# Patient Record
Sex: MALE | Race: WHITE | NOT HISPANIC OR LATINO | Employment: OTHER | URBAN - METROPOLITAN AREA
[De-identification: names, ages, dates, MRNs, and addresses within clinical notes are randomized per-mention and may not be internally consistent; named-entity substitution may affect disease eponyms.]

---

## 2020-12-22 NOTE — PATIENT DISCUSSION
CHOROIDAL NEVUS, OD: PRESCRIBED UV PROTECTION TO MINIMIZE UV EXPOSURE. PHOTOS TO DOCUMENT TODAY. RETURN FOR FOLLOW-UP AS SCHEDULED.

## 2022-12-01 ENCOUNTER — ESTABLISHED PATIENT (OUTPATIENT)
Dept: URBAN - METROPOLITAN AREA CLINIC 19 | Facility: CLINIC | Age: 72
End: 2022-12-01

## 2022-12-01 PROCEDURE — 92014 COMPRE OPH EXAM EST PT 1/>: CPT

## 2022-12-01 PROCEDURE — 92250 FUNDUS PHOTOGRAPHY W/I&R: CPT

## 2022-12-01 ASSESSMENT — KERATOMETRY
OD_AXISANGLE2_DEGREES: 174
OS_K2POWER_DIOPTERS: 45.50
OS_AXISANGLE2_DEGREES: 9
OD_K1POWER_DIOPTERS: 44.50
OS_AXISANGLE_DEGREES: 099
OS_K2POWER_DIOPTERS: 45.75
OD_AXISANGLE2_DEGREES: 173
OS_AXISANGLE_DEGREES: 085
OD_K1POWER_DIOPTERS: 43.50
OD_K2POWER_DIOPTERS: 45.00
OS_K1POWER_DIOPTERS: 43.50
OD_K2POWER_DIOPTERS: 44.75
OD_AXISANGLE_DEGREES: 084
OS_AXISANGLE2_DEGREES: 175
OD_AXISANGLE_DEGREES: 083

## 2022-12-01 ASSESSMENT — TONOMETRY
OD_IOP_MMHG: 13
OS_IOP_MMHG: 10

## 2022-12-01 ASSESSMENT — VISUAL ACUITY
OS_CC: 20/30-1
OU_CC: 20/20
OD_CC: 20/20-2

## 2023-12-04 ENCOUNTER — ESTABLISHED PATIENT (OUTPATIENT)
Dept: URBAN - METROPOLITAN AREA CLINIC 19 | Facility: CLINIC | Age: 73
End: 2023-12-04

## 2023-12-04 DIAGNOSIS — H52.4: ICD-10-CM

## 2023-12-04 DIAGNOSIS — H02.833: ICD-10-CM

## 2023-12-04 DIAGNOSIS — H02.836: ICD-10-CM

## 2023-12-04 DIAGNOSIS — H35.362: ICD-10-CM

## 2023-12-04 DIAGNOSIS — H43.393: ICD-10-CM

## 2023-12-04 PROCEDURE — 92015 DETERMINE REFRACTIVE STATE: CPT

## 2023-12-04 PROCEDURE — 92014 COMPRE OPH EXAM EST PT 1/>: CPT

## 2023-12-04 ASSESSMENT — KERATOMETRY
OD_AXISANGLE2_DEGREES: 174
OS_K1POWER_DIOPTERS: 43.50
OS_K2POWER_DIOPTERS: 45.75
OS_AXISANGLE2_DEGREES: 175
OS_AXISANGLE_DEGREES: 085
OD_K1POWER_DIOPTERS: 45.0
OD_K2POWER_DIOPTERS: 44.75
OD_AXISANGLE_DEGREES: 084

## 2023-12-04 ASSESSMENT — VISUAL ACUITY
OD_CC: 20/30
OS_PH: 20/40-2
OU_CC: 20/20
OS_CC: 20/50

## 2023-12-04 ASSESSMENT — TONOMETRY
OS_IOP_MMHG: 9
OD_IOP_MMHG: 7